# Patient Record
Sex: MALE | Race: WHITE | URBAN - METROPOLITAN AREA
[De-identification: names, ages, dates, MRNs, and addresses within clinical notes are randomized per-mention and may not be internally consistent; named-entity substitution may affect disease eponyms.]

---

## 2020-08-12 ENCOUNTER — TESTING ONLY (OUTPATIENT)
Dept: URBAN - METROPOLITAN AREA CLINIC 33 | Facility: CLINIC | Age: 40
End: 2020-08-12

## 2020-08-12 ASSESSMENT — INTRAOCULAR PRESSURE
OD: 12
OS: 13

## 2020-08-12 ASSESSMENT — VISUAL ACUITY
OD: 20/20
OS: 20/20

## 2020-09-01 ENCOUNTER — OFFICE VISIT (OUTPATIENT)
Dept: URBAN - METROPOLITAN AREA CLINIC 23 | Facility: CLINIC | Age: 40
End: 2020-09-01
Payer: COMMERCIAL

## 2020-09-01 DIAGNOSIS — H52.13 MYOPIA, BILATERAL: Primary | ICD-10-CM

## 2020-09-01 NOTE — IMPRESSION/PLAN
Impression: Myopia, bilateral: H52.13. Condition: quality of life issue. Symptoms: could improve with surgery. Vision: vision affected. Plan: Discussed diagnosis in detail with patient. Discussed treatment options with patient. Discussed ICL vs RLE OU. Surgical risks and benefits were discussed, explained and understood by patient. Patient elects to have surgery.  RL-2

## 2020-09-28 ENCOUNTER — PRE-OPERATIVE VISIT (OUTPATIENT)
Dept: URBAN - METROPOLITAN AREA CLINIC 23 | Facility: CLINIC | Age: 40
End: 2020-09-28
Payer: COMMERCIAL

## 2020-09-28 PROCEDURE — 76519 ECHO EXAM OF EYE: CPT | Performed by: OPHTHALMOLOGY

## 2020-09-28 ASSESSMENT — PACHYMETRY
OS: 27.25
OS: 3.80
OD: 27.41
OD: 3.78

## 2020-10-15 ENCOUNTER — SURGERY (OUTPATIENT)
Dept: URBAN - METROPOLITAN AREA SURGERY 11 | Facility: SURGERY | Age: 40
End: 2020-10-15

## 2020-10-15 PROCEDURE — SURGI PHAKIC IOL SURGEON'S FEE: CUSTOM | Performed by: OPHTHALMOLOGY

## 2020-10-16 ENCOUNTER — POST-OPERATIVE VISIT (OUTPATIENT)
Dept: URBAN - METROPOLITAN AREA CLINIC 23 | Facility: CLINIC | Age: 40
End: 2020-10-16

## 2020-10-16 DIAGNOSIS — Z48.810 ENCOUNTER FOR SURGICAL AFTERCARE FOLLOWING SURGERY ON A SENSE ORGAN: Primary | ICD-10-CM

## 2020-10-16 PROCEDURE — 99024 POSTOP FOLLOW-UP VISIT: CPT | Performed by: OPTOMETRIST

## 2020-10-16 ASSESSMENT — INTRAOCULAR PRESSURE
OS: 12
OD: 12

## 2020-10-16 NOTE — IMPRESSION/PLAN
Impression: S/P ICL OU - 1 Day. Encounter for surgical aftercare following surgery on a sense organ  Z48.810. Plan: Return in one week with Dr. Matias Gomez for PO2 --Continue Ofloxacin 0.3%
--Taper Prednisolone acetate 1% QID x 1 wk, TID x 1 wk, BID x 1wk, QD x 1wk, then d/c
--Advised patient to use artificial tears for comfort.

## 2020-10-21 ENCOUNTER — POST-OPERATIVE VISIT (OUTPATIENT)
Dept: URBAN - METROPOLITAN AREA CLINIC 23 | Facility: CLINIC | Age: 40
End: 2020-10-21

## 2020-10-21 PROCEDURE — 99024 POSTOP FOLLOW-UP VISIT: CPT | Performed by: OPHTHALMOLOGY

## 2020-10-21 ASSESSMENT — INTRAOCULAR PRESSURE
OS: 25
OD: 25

## 2020-10-21 NOTE — IMPRESSION/PLAN
Impression: S/P ICL OU - 6 Days. Encounter for surgical aftercare following surgery on a sense organ  Z48.810. Excellent post op course   Post operative instructions reviewed - Condition is improving - Plan: Discussed elevated IOP OU. Continue PF QID OU X 2 days then D/C Finish PF QID OU.